# Patient Record
Sex: FEMALE | Race: WHITE | NOT HISPANIC OR LATINO | Employment: FULL TIME | URBAN - METROPOLITAN AREA
[De-identification: names, ages, dates, MRNs, and addresses within clinical notes are randomized per-mention and may not be internally consistent; named-entity substitution may affect disease eponyms.]

---

## 2023-04-11 DIAGNOSIS — I10 HYPERTENSION, UNSPECIFIED TYPE: ICD-10-CM

## 2023-04-11 RX ORDER — BISOPROLOL FUMARATE AND HYDROCHLOROTHIAZIDE 5; 6.25 MG/1; MG/1
1 TABLET ORAL DAILY
COMMUNITY
Start: 2021-11-17 | End: 2023-04-11 | Stop reason: SDUPTHER

## 2023-04-12 RX ORDER — BISOPROLOL FUMARATE AND HYDROCHLOROTHIAZIDE 5; 6.25 MG/1; MG/1
1 TABLET ORAL DAILY
Qty: 90 TABLET | Refills: 0 | Status: SHIPPED | OUTPATIENT
Start: 2023-04-12 | End: 2023-05-11 | Stop reason: SDUPTHER

## 2023-05-11 DIAGNOSIS — I10 HYPERTENSION, UNSPECIFIED TYPE: ICD-10-CM

## 2023-05-11 RX ORDER — BISOPROLOL FUMARATE AND HYDROCHLOROTHIAZIDE 5; 6.25 MG/1; MG/1
1 TABLET ORAL DAILY
Qty: 10 TABLET | Refills: 0 | Status: SHIPPED | OUTPATIENT
Start: 2023-05-11 | End: 2024-03-06

## 2023-05-11 RX ORDER — BISOPROLOL FUMARATE AND HYDROCHLOROTHIAZIDE 5; 6.25 MG/1; MG/1
1 TABLET ORAL DAILY
Qty: 90 TABLET | Refills: 0 | Status: SHIPPED | OUTPATIENT
Start: 2023-05-11 | End: 2024-03-06

## 2023-05-11 NOTE — TELEPHONE ENCOUNTER
PATIENT IS CALLING TO REFILL HER bisoproloL-hydrochlorothiazide (Ziac).  SHE IS GOING TO NEED 10 SENT TO LOCAL PHARMACY   [id number] - [address]   AND THE REST OF A 90 DAY SUPPLY SENT TO   [facility].  SHE SWITCHED INSURANCE AND [facility] HAS TO BE HER CHOICE FOR THEM TO COVER IT.    PLEASE ADVISE IF THERE IS ANY ISSUES WITH THIS SO THAT WE CAN CONTACT PATIENT.  SHE IS ALMOST OF MEDICATION.

## 2023-11-28 ENCOUNTER — APPOINTMENT (OUTPATIENT)
Dept: OBSTETRICS AND GYNECOLOGY | Facility: CLINIC | Age: 33
End: 2023-11-28
Payer: COMMERCIAL

## 2023-12-13 DIAGNOSIS — I10 ESSENTIAL HYPERTENSION: ICD-10-CM

## 2023-12-13 PROBLEM — F32.A DEPRESSION: Status: ACTIVE | Noted: 2023-12-13

## 2023-12-13 RX ORDER — BISOPROLOL FUMARATE AND HYDROCHLOROTHIAZIDE 5; 6.25 MG/1; MG/1
1 TABLET ORAL DAILY
Qty: 30 TABLET | Refills: 0 | Status: SHIPPED | OUTPATIENT
Start: 2023-12-13 | End: 2024-01-08

## 2024-01-08 DIAGNOSIS — I10 ESSENTIAL HYPERTENSION: ICD-10-CM

## 2024-01-08 RX ORDER — BISOPROLOL FUMARATE AND HYDROCHLOROTHIAZIDE 5; 6.25 MG/1; MG/1
1 TABLET ORAL DAILY
Qty: 30 TABLET | Refills: 0 | Status: SHIPPED | OUTPATIENT
Start: 2024-01-08 | End: 2024-02-01

## 2024-02-01 DIAGNOSIS — I10 ESSENTIAL HYPERTENSION: ICD-10-CM

## 2024-02-01 RX ORDER — BISOPROLOL FUMARATE AND HYDROCHLOROTHIAZIDE 5; 6.25 MG/1; MG/1
1 TABLET ORAL DAILY
Qty: 30 TABLET | Refills: 0 | Status: SHIPPED | OUTPATIENT
Start: 2024-02-01 | End: 2024-03-06 | Stop reason: ALTCHOICE

## 2024-02-20 NOTE — PROGRESS NOTES
Subjective   Patient ID: Ana Najera is a 33 y.o. female who presents for No chief complaint on file..    HPI    Patient presents today for annual exam.  Denies SOB or chest pain.  Eats healthy diet.  Exercises 2-3x per week.  Sleeping well.  Denies abdominal pain, black or bloody stools.  BM/Urination normal.  Last eye exam was  Last dental exam was  No new family h/o cancers or heart disease   Is/not fasting for BW.    HTN follow up   Denies any swelling, headaches, lightheadedness or dizziness  Does/does not check BP at home   Currently taking Bisoprolol-hydrochlorothiazide        Review of systems  ; Patient seen today for exam denies any problems with headaches or vision, denies any shortness of breath chest pain nausea or vomiting, no black stool no blood in the stool no heartburn type symptoms denies any problems with constipation or diarrhea, and no dysuria-type symptoms    The patient's allergies medications were reviewed with them today    The patient's social family and surgical history or also reviewed here today, along with her past medical history.     Objective     Alert and active in  no acute distress  HEENT TMs clear oropharynx negative nares clear no drainage noted neck supple  With no adenopathy   Heart regular rate and rhythm without murmur and no carotid bruits  Lungs- clear to auscultation bilaterally, no wheeze or rhonchi noted  Thyroid -negative masses or nodularity  Abdomen- soft times four quadrants , bowel sounds positive no masses or organomegaly, negative tenderness guarding or rebound  Neurological exam unremarkable- DTRs in upper and lower extremities within normal limits.   skin -no lesions noted      There were no vitals taken for this visit.    Not on File    Assessment/Plan   Problem List Items Addressed This Visit    None        If anything worsens or changes please call us at once, follow up in the office as planned,

## 2024-02-21 ENCOUNTER — APPOINTMENT (OUTPATIENT)
Dept: PRIMARY CARE | Facility: CLINIC | Age: 34
End: 2024-02-21
Payer: COMMERCIAL

## 2024-03-06 ENCOUNTER — OFFICE VISIT (OUTPATIENT)
Dept: PRIMARY CARE | Facility: CLINIC | Age: 34
End: 2024-03-06
Payer: COMMERCIAL

## 2024-03-06 VITALS
BODY MASS INDEX: 24.86 KG/M2 | SYSTOLIC BLOOD PRESSURE: 106 MMHG | HEART RATE: 51 BPM | RESPIRATION RATE: 16 BRPM | OXYGEN SATURATION: 99 % | TEMPERATURE: 97.6 F | WEIGHT: 145.6 LBS | HEIGHT: 64 IN | DIASTOLIC BLOOD PRESSURE: 64 MMHG

## 2024-03-06 DIAGNOSIS — R53.81 MALAISE AND FATIGUE: ICD-10-CM

## 2024-03-06 DIAGNOSIS — Z00.00 ROUTINE GENERAL MEDICAL EXAMINATION AT A HEALTH CARE FACILITY: Primary | ICD-10-CM

## 2024-03-06 DIAGNOSIS — F32.A DEPRESSION, UNSPECIFIED DEPRESSION TYPE: ICD-10-CM

## 2024-03-06 DIAGNOSIS — R53.83 MALAISE AND FATIGUE: ICD-10-CM

## 2024-03-06 DIAGNOSIS — I10 ESSENTIAL HYPERTENSION: ICD-10-CM

## 2024-03-06 PROCEDURE — 3008F BODY MASS INDEX DOCD: CPT | Performed by: FAMILY MEDICINE

## 2024-03-06 PROCEDURE — 3074F SYST BP LT 130 MM HG: CPT | Performed by: FAMILY MEDICINE

## 2024-03-06 PROCEDURE — 1036F TOBACCO NON-USER: CPT | Performed by: FAMILY MEDICINE

## 2024-03-06 PROCEDURE — 3078F DIAST BP <80 MM HG: CPT | Performed by: FAMILY MEDICINE

## 2024-03-06 PROCEDURE — 99395 PREV VISIT EST AGE 18-39: CPT | Performed by: FAMILY MEDICINE

## 2024-03-06 RX ORDER — BISOPROLOL FUMARATE AND HYDROCHLOROTHIAZIDE 2.5; 6.25 MG/1; MG/1
1 TABLET ORAL DAILY
Qty: 90 TABLET | Refills: 1 | Status: SHIPPED | OUTPATIENT
Start: 2024-03-06 | End: 2025-03-06

## 2024-03-06 RX ORDER — BISOPROLOL FUMARATE AND HYDROCHLOROTHIAZIDE 5; 6.25 MG/1; MG/1
1 TABLET ORAL DAILY
Qty: 90 TABLET | Refills: 1 | Status: CANCELLED | OUTPATIENT
Start: 2024-03-06

## 2024-03-06 NOTE — PROGRESS NOTES
"Subjective   Patient ID: Ana Najera is a 33 y.o. female who presents for Annual Exam and Hypertension.  HPI  Annual physical   Eats a generally healthy diet   Exercises   Denies any chest pain,SOB  No Abdominal pain   No black or bloody stools   Urination/BM normal   Last eye apt 2 month  Last dental apt 1/28/24  Last Gyn apt 1 1/2 years  No new family h/o cancers or heart disease    HTN follow up  Denies swelling, headaches, lightheadedness or dizziness.    Does not check BP at home.   Currently taking bisoprolol-hydrochlorothiazide     No other concern   Review of systems  ; Patient seen today for exam denies any problems with headaches or vision, denies any shortness of breath chest pain nausea or vomiting, no black stool no blood in the stool no heartburn type symptoms denies any problems with constipation or diarrhea, and no dysuria-type symptoms    The patient's allergies medications were reviewed with them today    The patient's social family and surgical history or also reviewed here today, along with her past medical history.     Objective     Alert and active in  no acute distress  HEENT TMs clear oropharynx negative nares clear no drainage noted neck supple  With no adenopathy   Heart regular rate and rhythm without murmur and no carotid bruits  Lungs- clear to auscultation bilaterally, no wheeze or rhonchi noted  Thyroid -negative masses or nodularity  Abdomen- soft times four quadrants , bowel sounds positive no masses or organomegaly, negative tenderness guarding or rebound  Neurological exam unremarkable- DTRs in upper and lower extremities within normal limits.   skin -no lesions noted      /64 (BP Location: Left arm, Patient Position: Sitting, BP Cuff Size: Adult)   Pulse 51   Temp 36.4 °C (97.6 °F) (Temporal)   Resp 16   Ht 1.626 m (5' 4\")   Wt 66 kg (145 lb 9.6 oz)   SpO2 99%   BMI 24.99 kg/m²     No Known Allergies    Assessment/Plan   Problem List Items Addressed This Visit  "      Essential hypertension    Relevant Medications    bisoproloL-hydrochlorothiazide (Ziac) 2.5-6.25 mg tablet    Other Relevant Orders    CBC and Auto Differential    Comprehensive Metabolic Panel    Depression    Malaise and fatigue    Relevant Orders    Lipid Panel    Routine general medical examination at a health care facility    BMI 24.0-24.9, adult   Doing well with her blood pressure we will cut the dose down to 2.5 mg over the next few weeks if she is doing well she is lost over 30 pounds watching her sugar exercising we will go ahead then and we will contemplate birth control with the minipill or progesterone pill      She understands and agrees with our plan and will give me a call at that time      If anything worsens or changes please call us at once, follow up in the office as planned,

## 2024-07-09 ENCOUNTER — APPOINTMENT (OUTPATIENT)
Dept: OBSTETRICS AND GYNECOLOGY | Facility: CLINIC | Age: 34
End: 2024-07-09
Payer: COMMERCIAL

## 2024-07-09 VITALS
WEIGHT: 140.38 LBS | HEART RATE: 70 BPM | BODY MASS INDEX: 23.39 KG/M2 | OXYGEN SATURATION: 98 % | HEIGHT: 65 IN | SYSTOLIC BLOOD PRESSURE: 153 MMHG | DIASTOLIC BLOOD PRESSURE: 94 MMHG

## 2024-07-09 DIAGNOSIS — Z12.4 ENCOUNTER FOR SCREENING FOR MALIGNANT NEOPLASM OF CERVIX: ICD-10-CM

## 2024-07-09 DIAGNOSIS — Z30.430 ENCOUNTER FOR INSERTION OF PARAGARD IUD: Primary | ICD-10-CM

## 2024-07-09 LAB — PREGNANCY TEST URINE, POC: NEGATIVE

## 2024-07-09 PROCEDURE — 58300 INSERT INTRAUTERINE DEVICE: CPT | Performed by: ADVANCED PRACTICE MIDWIFE

## 2024-07-09 PROCEDURE — 81025 URINE PREGNANCY TEST: CPT | Performed by: ADVANCED PRACTICE MIDWIFE

## 2024-07-09 ASSESSMENT — PATIENT HEALTH QUESTIONNAIRE - PHQ9
SUM OF ALL RESPONSES TO PHQ9 QUESTIONS 1 & 2: 0
2. FEELING DOWN, DEPRESSED OR HOPELESS: NOT AT ALL
1. LITTLE INTEREST OR PLEASURE IN DOING THINGS: NOT AT ALL

## 2024-07-09 NOTE — PROGRESS NOTES
Assessment/Plan   Diagnoses and all orders for this visit:  Encounter for insertion of ParaGard IUD  ParaGard IUD inserted without complication.  Patient identified as an appropriate candidate prior to procedure. Indications, risks, and benefits discussed including uterine perforation, infection, expulsion.  Common side effects also discussed.  May use ibuprofen 600mg PO q6hrs PRN for discomforts. Instructed nothing per vagina x 24hrs. Call for severe pain, heavy vaginal bleeding, foul discharge, fever/chills, device palpable at cervix.    RTC 6 weeks String Check    Subjective   Ana Najera is a 33 y.o. female who presents for IUD Paragard Insertion.  Using condoms, currently on period    Menstrual History:  OB History    No obstetric history on file.        Menarche age: 13  No LMP recorded.    WILMA LEES as in HPI    Objective   There were no vitals taken for this visit.    Physical Exam  Constitutional:       General: She is not in acute distress.  Pulmonary:      Effort: Pulmonary effort is normal.   Genitourinary:     General: Normal vulva.      Vagina: Normal.      Cervix: Normal.      Uterus: Normal.       Adnexa: Right adnexa normal and left adnexa normal.      Comments: Scant menstrual blood in vaginal vault  Skin:     General: Skin is warm and dry.      Findings: No lesion.   Neurological:      Mental Status: She is alert.         Patient ID: Ana Najera is a 33 y.o. female.    IUD Insertion    Date/Time: 7/9/2024 8:38 AM    Performed by: ELIO Silverio  Authorized by: ELIO Silverio    Consent:     Consent obtained:  Written    Consent given by:  Patient    Procedure risks and benefits discussed: yes      Patient questions answered: yes      Patient agrees, verbalizes understanding, and wants to proceed: yes    Procedure:     Pelvic exam performed: yes      Negative urine pregnancy test: yes      Cervix cleaned and prepped: yes      Speculum  placed in vagina: yes      Tenaculum applied to cervix: yes      Uterus sounded: yes      Uterus sound depth (cm):  7    IUD inserted with no complications: yes      IUD type:  ParaGard    Strings trimmed: yes    Post-procedure:     Patient tolerated procedure well: yes

## 2024-08-20 ENCOUNTER — APPOINTMENT (OUTPATIENT)
Dept: OBSTETRICS AND GYNECOLOGY | Facility: CLINIC | Age: 34
End: 2024-08-20
Payer: COMMERCIAL

## 2024-10-14 ENCOUNTER — APPOINTMENT (OUTPATIENT)
Dept: OBSTETRICS AND GYNECOLOGY | Facility: CLINIC | Age: 34
End: 2024-10-14
Payer: COMMERCIAL

## 2024-10-14 VITALS
SYSTOLIC BLOOD PRESSURE: 135 MMHG | DIASTOLIC BLOOD PRESSURE: 89 MMHG | HEIGHT: 65 IN | BODY MASS INDEX: 23.16 KG/M2 | WEIGHT: 139 LBS

## 2024-10-14 DIAGNOSIS — Z30.431 SURVEILLANCE FOR BIRTH CONTROL, INTRAUTERINE DEVICE: Primary | ICD-10-CM

## 2024-10-14 PROCEDURE — 3079F DIAST BP 80-89 MM HG: CPT | Performed by: ADVANCED PRACTICE MIDWIFE

## 2024-10-14 PROCEDURE — 99213 OFFICE O/P EST LOW 20 MIN: CPT | Performed by: ADVANCED PRACTICE MIDWIFE

## 2024-10-14 PROCEDURE — 3075F SYST BP GE 130 - 139MM HG: CPT | Performed by: ADVANCED PRACTICE MIDWIFE

## 2024-10-14 PROCEDURE — 1036F TOBACCO NON-USER: CPT | Performed by: ADVANCED PRACTICE MIDWIFE

## 2024-10-14 PROCEDURE — 3008F BODY MASS INDEX DOCD: CPT | Performed by: ADVANCED PRACTICE MIDWIFE

## 2024-10-14 NOTE — PROGRESS NOTES
"Assessment/Plan   Ana was seen today for follow-up.  Diagnoses and all orders for this visit:  Surveillance for birth control, intrauterine device  Paragard IUD in appropriate placement  Common side effects discussed  Warning S&S reviewed    Follow up annually      Subjective   Ana Najera is a 33 y.o. female who presents for Paragard IUD follow up  Placement: 24  Side Effects: heavy periods with cramping, getting better with each one    Menstrual History:  OB History          3    Para        Term                AB        Living   3         SAB        IAB        Ectopic        Multiple        Live Births                           ROS as in HPI    Objective   /89 (BP Location: Right arm, Patient Position: Sitting)   Ht 1.651 m (5' 5\")   Wt 63 kg (139 lb)   LMP 2024 (Exact Date)   BMI 23.13 kg/m²     Physical Exam  Pulmonary:      Effort: Pulmonary effort is normal.   Abdominal:      Tenderness: There is no abdominal tenderness.   Genitourinary:     General: Normal vulva.      Vagina: Normal.      Cervix: Normal.      Uterus: Normal.       Adnexa: Right adnexa normal and left adnexa normal.      Comments: IUD strings visualized, device neither visible nor palpable    Neurological:      Mental Status: She is alert.           "

## 2024-12-09 ENCOUNTER — TELEMEDICINE (OUTPATIENT)
Dept: PRIMARY CARE | Facility: CLINIC | Age: 34
End: 2024-12-09
Payer: COMMERCIAL

## 2024-12-09 DIAGNOSIS — J22 LOWER RESP. TRACT INFECTION: Primary | ICD-10-CM

## 2024-12-09 PROCEDURE — 1036F TOBACCO NON-USER: CPT

## 2024-12-09 PROCEDURE — 99213 OFFICE O/P EST LOW 20 MIN: CPT

## 2024-12-09 RX ORDER — AZITHROMYCIN 250 MG/1
TABLET, FILM COATED ORAL
Qty: 6 TABLET | Refills: 0 | Status: SHIPPED | OUTPATIENT
Start: 2024-12-09 | End: 2024-12-14

## 2024-12-09 RX ORDER — BENZONATATE 200 MG/1
200 CAPSULE ORAL 3 TIMES DAILY PRN
Qty: 21 CAPSULE | Refills: 0 | Status: SHIPPED | OUTPATIENT
Start: 2024-12-09 | End: 2024-12-16

## 2024-12-09 ASSESSMENT — ENCOUNTER SYMPTOMS
MYALGIAS: 1
COUGH: 1
HEADACHES: 1
RHINORRHEA: 0
FEVER: 1
SORE THROAT: 1
CHILLS: 1
VOMITING: 0
DIARRHEA: 0
NAUSEA: 0
WHEEZING: 0
APPETITE CHANGE: 1
SHORTNESS OF BREATH: 0
ABDOMINAL PAIN: 0

## 2024-12-09 NOTE — PROGRESS NOTES
Subjective   Patient ID: Ana Najera is a 34 y.o. female who presents for cough.    With patient's permission, this is a Telemedicine visit with video and audio. Provider located at office address. Patient located at their home address. All issues as documented below were discussed and addressed but limited physical exam was performed. If it was felt that the patient should be evaluated via face-to-face office appointment(s) they were directed to appropriate location.     Cough  This is a new problem. The current episode started in the past 7 days. The problem has been gradually worsening. The cough is Non-productive. Associated symptoms include chills, a fever, headaches, myalgias and a sore throat. Pertinent negatives include no chest pain, nasal congestion, postnasal drip, rhinorrhea, shortness of breath or wheezing. She has tried OTC cough suppressant (Tylenol/NSAIDs) for the symptoms. The treatment provided no relief. There is no history of asthma.   Kids are sick.     Review of Systems   Constitutional:  Positive for appetite change, chills and fever.   HENT:  Positive for sore throat. Negative for postnasal drip and rhinorrhea.    Respiratory:  Positive for cough. Negative for shortness of breath and wheezing.    Cardiovascular:  Negative for chest pain.   Gastrointestinal:  Negative for abdominal pain, diarrhea, nausea and vomiting.   Musculoskeletal:  Positive for myalgias.   Neurological:  Positive for headaches.     Objective   There were no vitals taken for this visit.    Physical Exam  Limited due to virtual encounter.   General:  Appears alert and oriented and well-nourished with no signs of acute distress.   Face:  Normal appearing with no obvious neurological deficits.  Eyes:  EOMI x2.  Respiratory:  Breathing is non-labored.   Psychiatric:  Affect is appropriate and shows good judgment.     Assessment/Plan   Assessment & Plan  Lower resp. tract infection  Acute. Will treat at this time due to  worsening of symptoms.   Azithromycin as directed. Risks and benefits of medication discussed and prescribed.   Benzonatate PRN for cough. Risks and benefits of medication discussed and prescribed.   OTC Tylenol/Ibuprofen as directed for sinus pain, aches. OTC Flonase as directed for sinus congestion and ear pressure. OTC antihistamine as directed for sinus drainage. Increase fluids, rest, humidifier.   Follow up with PCP/UC if symptoms do not improve within 7-10 days, or sooner for worsening.     Orders:    azithromycin (Zithromax) 250 mg tablet; Take 2 tablets (500 mg) by mouth once daily for 1 day, THEN 1 tablet (250 mg) once daily for 4 days. Take 2 tabs (500 mg) by mouth today, than 1 daily for 4 days..    benzonatate (Tessalon) 200 mg capsule; Take 1 capsule (200 mg) by mouth 3 times a day as needed for cough for up to 7 days. Do not crush or chew.

## 2025-01-27 ENCOUNTER — OFFICE VISIT (OUTPATIENT)
Dept: OBSTETRICS AND GYNECOLOGY | Facility: CLINIC | Age: 35
End: 2025-01-27
Payer: COMMERCIAL

## 2025-01-27 VITALS
SYSTOLIC BLOOD PRESSURE: 153 MMHG | HEIGHT: 65 IN | BODY MASS INDEX: 24.87 KG/M2 | WEIGHT: 149.25 LBS | DIASTOLIC BLOOD PRESSURE: 99 MMHG

## 2025-01-27 DIAGNOSIS — B37.9 YEAST INFECTION: ICD-10-CM

## 2025-01-27 PROCEDURE — 99214 OFFICE O/P EST MOD 30 MIN: CPT | Performed by: OBSTETRICS & GYNECOLOGY

## 2025-01-27 PROCEDURE — 3008F BODY MASS INDEX DOCD: CPT | Performed by: OBSTETRICS & GYNECOLOGY

## 2025-01-27 PROCEDURE — 3080F DIAST BP >= 90 MM HG: CPT | Performed by: OBSTETRICS & GYNECOLOGY

## 2025-01-27 PROCEDURE — 3077F SYST BP >= 140 MM HG: CPT | Performed by: OBSTETRICS & GYNECOLOGY

## 2025-01-27 PROCEDURE — 87529 HSV DNA AMP PROBE: CPT | Performed by: OBSTETRICS & GYNECOLOGY

## 2025-01-27 RX ORDER — CLOBETASOL PROPIONATE 0.5 MG/G
OINTMENT TOPICAL 2 TIMES DAILY
Qty: 30 G | Refills: 1 | Status: SHIPPED | OUTPATIENT
Start: 2025-01-27

## 2025-01-27 NOTE — PROGRESS NOTES
Subjective   Patient ID: Ana Najera is a 34 y.o. female who presents for Vaginitis/Bacterial Vaginosis (Pt is here for possible yeast infection. She reports a lot of itching and burning in her vagina. She also reports a slight odor, denies discharge. Symptoms started 1/26/2025./Ginny Wen MA/).  HPI  Pt presents for yeast infection.  Has had for a few days, it is on the outside,  Pt states that she doesn't have much discharge.  She gets this irritation all the time.  She also has to wear panty liners daily.  Not much odor to the discharge.  Pt has tried OTC monestat and that caused irritation, she used the 1 day.  No problems with periods.    Review of Systems  all other symptoms were found to be neg except for HPI/CC.      Objective   Physical Exam  General  General Appearance - normal build and Well groomed, Not in acute distress, No acute respiratory distress.  Mental Status - Alert.    Integumentary  - - warm and dry with no rashes.    Head and Neck  - - normalocephalic.    Eye  - - Bilateral - pupils equal and round and sclera clear.    Chest and Lung Exam  - - Bilateral - normal breathing effort.    Female Genitourinary  - - vulva: rash seen on labia majora on both sides, slight erythema and areas of tearing noted.  White vaginal discharge noted.    Musculoskeletal  - - normal posture and normal gait and station.      Assessment/Plan   Diagnoses and all orders for this visit:  Yeast infection  -     Vaginitis Gram Stain For Bacterial Vaginosis + Yeast  -     clobetasol (Temovate) 0.05 % ointment; Apply topically 2 times a day.  -     HSV PCR, Skin/Mucosa     Vaginal swab and HSV swab done  Will call with results  Gave steroid for irritation  Pt is to call with any questions of concerns.  Pt is to F/U in 1 yr for annual exam or PRN.        stretcher

## 2025-01-30 DIAGNOSIS — B37.9 YEAST INFECTION: Primary | ICD-10-CM

## 2025-01-30 LAB
BV SCORE VAG QL: ABNORMAL
YEAST SPEC QL CULT: ABNORMAL

## 2025-01-30 RX ORDER — FLUCONAZOLE 100 MG/1
100 TABLET ORAL DAILY
Qty: 3 TABLET | Refills: 0 | Status: SHIPPED | OUTPATIENT
Start: 2025-01-30 | End: 2025-01-31 | Stop reason: SDUPTHER

## 2025-01-31 ENCOUNTER — TELEPHONE (OUTPATIENT)
Dept: OBSTETRICS AND GYNECOLOGY | Facility: CLINIC | Age: 35
End: 2025-01-31
Payer: COMMERCIAL

## 2025-01-31 DIAGNOSIS — B37.9 YEAST INFECTION: ICD-10-CM

## 2025-01-31 LAB
BV SCORE VAG QL: ABNORMAL
SCAN RESULT: NORMAL
YEAST SPEC QL CULT: ABNORMAL

## 2025-01-31 RX ORDER — FLUCONAZOLE 100 MG/1
100 TABLET ORAL DAILY
Qty: 3 TABLET | Refills: 0 | Status: SHIPPED | OUTPATIENT
Start: 2025-01-31 | End: 2025-02-03

## 2025-01-31 NOTE — TELEPHONE ENCOUNTER
----- Message from Jesi Mariee sent at 1/31/2025 11:11 AM EST -----  Ok to call and tell the the HSV swab was negative.